# Patient Record
Sex: FEMALE | Race: WHITE | NOT HISPANIC OR LATINO | Employment: FULL TIME | URBAN - METROPOLITAN AREA
[De-identification: names, ages, dates, MRNs, and addresses within clinical notes are randomized per-mention and may not be internally consistent; named-entity substitution may affect disease eponyms.]

---

## 2019-12-04 ENCOUNTER — OFFICE VISIT (OUTPATIENT)
Dept: BARIATRICS | Facility: CLINIC | Age: 49
End: 2019-12-04
Payer: COMMERCIAL

## 2019-12-04 VITALS
DIASTOLIC BLOOD PRESSURE: 74 MMHG | WEIGHT: 164.8 LBS | TEMPERATURE: 97.4 F | SYSTOLIC BLOOD PRESSURE: 102 MMHG | HEIGHT: 60 IN | BODY MASS INDEX: 32.35 KG/M2 | HEART RATE: 78 BPM

## 2019-12-04 DIAGNOSIS — F41.9 ANXIETY: ICD-10-CM

## 2019-12-04 DIAGNOSIS — E66.9 OBESITY (BMI 30.0-34.9): Primary | ICD-10-CM

## 2019-12-04 PROBLEM — E66.3 OVERWEIGHT (BMI 25.0-29.9): Status: ACTIVE | Noted: 2019-12-04

## 2019-12-04 PROCEDURE — 99203 OFFICE O/P NEW LOW 30 MIN: CPT | Performed by: PHYSICIAN ASSISTANT

## 2019-12-04 RX ORDER — SUMATRIPTAN 100 MG/1
100 TABLET, FILM COATED ORAL
COMMUNITY
Start: 2014-09-19 | End: 2019-12-04 | Stop reason: ALTCHOICE

## 2019-12-04 RX ORDER — SERTRALINE HYDROCHLORIDE 100 MG/1
TABLET, FILM COATED ORAL
COMMUNITY
Start: 2019-10-21

## 2019-12-04 RX ORDER — CLONAZEPAM 0.5 MG/1
TABLET ORAL
COMMUNITY
Start: 2015-10-18

## 2019-12-04 RX ORDER — FLUOXETINE HYDROCHLORIDE 40 MG/1
60 CAPSULE ORAL
COMMUNITY
Start: 2016-09-13 | End: 2019-12-04 | Stop reason: ALTCHOICE

## 2019-12-04 RX ORDER — DAPSONE 50 MG/G
GEL TOPICAL
COMMUNITY
Start: 2018-01-18

## 2019-12-04 RX ORDER — SPIRONOLACTONE 50 MG/1
TABLET, FILM COATED ORAL
COMMUNITY
Start: 2019-11-16

## 2019-12-04 NOTE — ASSESSMENT & PLAN NOTE
-Discussed options of [facility]-Intensive Lifestyle Intervention Program, Very Low Calorie Diet-VLCD and Conservative Program and the role of weight loss medications   -Initial weight loss goal of 5-10% weight loss for improved health  -Screening labs  Recommend checking lab coverage before having labs drawn    -NEEDS Lipids, tsh, a1c, cmp, fasting insulin   - STOP BANG-0/8  -Patient is interested in pursuing Very Low Calorie Diet-VLCD    VLCD Review:  Labs ordered: yes  HTN meds addressed: n/a  DM2 meds addressed: n/a  VLCD time restriction based on BMI: no

## 2019-12-04 NOTE — PROGRESS NOTES
Assessment/Plan:    Obesity (BMI 30 0-34 9)  -Discussed options of [facility]-Intensive Lifestyle Intervention Program, Very Low Calorie Diet-VLCD and Conservative Program and the role of weight loss medications   -Initial weight loss goal of 5-10% weight loss for improved health  -Screening labs  Recommend checking lab coverage before having labs drawn  -NEEDS Lipids, tsh, a1c, cmp, fasting insulin   - STOP BANG-0/8  -Patient is interested in pursuing Very Low Calorie Diet-VLCD    VLCD Review:  Labs ordered: yes  HTN meds addressed: n/a  DM2 meds addressed: n/a  VLCD time restriction based on BMI: no    Anxiety  Taking klonopin and zoloft  -continue management with prescribing provider            Diagnoses and all orders for this visit:    Obesity (BMI 30 0-34 9)  -     Comprehensive metabolic panel; Future  -     TSH, 3rd generation with Free T4 reflex; Future  -     Lipid panel; Future  -     Hemoglobin A1C; Future  -     Insulin, fasting; Future    Anxiety    Other orders  -     Discontinue: SUMAtriptan (IMITREX) 100 mg tablet; Take 100 mg by mouth  -     spironolactone (ALDACTONE) 50 mg tablet  -     Dapsone 5 % topical gel  -     sertraline (ZOLOFT) 100 mg tablet  -     Discontinue: FLUoxetine (PROzac) 40 MG capsule; 60 mg  -     clonazePAM (KlonoPIN) 0 5 mg tablet  -     Discontinue: PROCTOFOAM HC rectal foam  -     Discontinue: lisdexamfetamine (VYVANSE) 30 MG capsule  -     OMEGA-3 FATTY ACIDS PO; Take by mouth          Subjective:   Chief Complaint   Patient presents with    Consult     Patient here for initial MWM consult with PA  BMI 32  Negative stop bang (0 of 8)  Patient ID: Saritha Corrigan  is a 51 y o  female with excess weight/obesity here to pursue weight management  Past Medical History:   Diagnosis Date    Anxiety     Obesity (BMI 30 0-34  9)     Overweight (BMI 25 0-29  9)        HPI:  Obesity/Excess Weight:  Severity: Moderate  Onset:  Since age 16    Modifiers: Diet and Exercise, Physician Supervised Weight Loss Program, Commercial Weight Loss Programs-ie  Weight Watchers, Martha Nay, Nutrisystem, etc  and Prescription Weight Loss Medications  Contributing factors: Poor Food Choices, mothers perception of her body   Associated symptoms: body image issues and decreased self esteem    Goals:135 lbs   Hydration: 5 seltzer per day, 3-4 large coffee with half half   Alcohol: rare     Patient notes she just eats because she does  She is not hungry  Has been given vyvanse by psychiatry for binge eating in the past but did not feel it helped  All of her life her mother has pushed diets on her and degraded her body image  Patient has seen therapist for the last 5 years regarding her weight, her mothers contribution to her weight issues and her obsession with weight  She notes this did not help and she has stopped seeing the therapist      Colonoscopy-Not applicable    The following portions of the patient's history were reviewed and updated as appropriate: allergies, current medications, past family history, past medical history, past social history, past surgical history and problem list     Review of Systems   HENT: Negative for sore throat  Respiratory: Negative for cough and shortness of breath  Cardiovascular: Negative for chest pain and palpitations  Gastrointestinal: Positive for constipation  Negative for abdominal pain, diarrhea, nausea and vomiting  Denies GERD   Endocrine: Negative for cold intolerance and heat intolerance  Genitourinary: Negative for dysuria  Musculoskeletal: Negative for arthralgias and back pain  Skin: Negative for rash  Neurological: Negative for headaches  Psychiatric/Behavioral: Negative for suicidal ideas (denies HI)          + Depression and anxiety--controlled with meds         Objective:    /74 (BP Location: Right arm, Patient Position: Sitting, Cuff Size: Standard)   Pulse 78   Temp (!) 97 4 °F (36 3 °C) (Tympanic) Ht 5' 0 25" (1 53 m)   Wt 74 8 kg (164 lb 12 8 oz)   BMI 31 92 kg/m²     Physical Exam   Nursing note and vitals reviewed  Constitutional   General appearance: Abnormal   well developed and obese  Eyes No conjunctival pallor  Ears, Nose, Mouth, and Throat Oral mucosa moist    Pulmonary   Respiratory effort: No increased work of breathing or signs of respiratory distress  Auscultation of lungs: Clear to auscultation, equal breath sounds bilaterally, no wheezes, no rales, no rhonci  Cardiovascular   Auscultation of heart: Normal rate and rhythm, normal S1 and S2, without murmurs  Examination of extremities for edema and/or varicosities: Normal   no edema  Abdomen   Abdomen: Abnormal   The abdomen was obese  Bowel sounds were normal  The abdomen was soft and nontender     Musculoskeletal   Gait and station: Normal     Psychiatric   Orientation to person, place and time: Normal     Affect: appropriate

## 2019-12-05 ENCOUNTER — OFFICE VISIT (OUTPATIENT)
Dept: BARIATRICS | Facility: CLINIC | Age: 49
End: 2019-12-05
Payer: COMMERCIAL

## 2019-12-05 VITALS — HEIGHT: 60 IN | BODY MASS INDEX: 32.39 KG/M2 | WEIGHT: 165 LBS

## 2019-12-05 DIAGNOSIS — R63.5 ABNORMAL WEIGHT GAIN: ICD-10-CM

## 2019-12-05 LAB — HBA1C MFR BLD HPLC: 5.4 %

## 2019-12-05 PROCEDURE — VLCD

## 2019-12-05 PROCEDURE — RECHECK

## 2019-12-05 NOTE — PROGRESS NOTES
Reviewed VLCD diet principles  Developed meal plan and reviewed product use  Ordered 2 week supply  We did not have the lab results for this patient, so reviewed plan, and ordered product, and she will  product once PA reviews lab results  Gave patient written VLCD education packet and left patient contact number  Will call contact patient in 2-3 days to assess tolerance

## 2019-12-09 ENCOUNTER — TELEPHONE (OUTPATIENT)
Dept: BARIATRICS | Facility: CLINIC | Age: 49
End: 2019-12-09

## 2019-12-09 LAB
ALBUMIN SERPL-MCNC: 4.9 G/DL (ref 3.5–5.5)
ALBUMIN/GLOB SERPL: 2.3 {RATIO} (ref 1.2–2.2)
ALP SERPL-CCNC: 41 IU/L (ref 39–117)
ALT SERPL-CCNC: 46 IU/L (ref 0–32)
AST SERPL-CCNC: 35 IU/L (ref 0–40)
BILIRUB SERPL-MCNC: <0.2 MG/DL (ref 0–1.2)
BUN SERPL-MCNC: 13 MG/DL (ref 6–24)
BUN/CREAT SERPL: 23 (ref 9–23)
CALCIUM SERPL-MCNC: 9.9 MG/DL (ref 8.7–10.2)
CHLORIDE SERPL-SCNC: 99 MMOL/L (ref 96–106)
CHOLEST SERPL-MCNC: 353 MG/DL (ref 100–199)
CO2 SERPL-SCNC: 27 MMOL/L (ref 20–29)
CREAT SERPL-MCNC: 0.57 MG/DL (ref 0.57–1)
GLOBULIN SER-MCNC: 2.1 G/DL (ref 1.5–4.5)
GLUCOSE SERPL-MCNC: 98 MG/DL (ref 65–99)
HBA1C MFR BLD: 5.4 % (ref 4.8–5.6)
HDLC SERPL-MCNC: 100 MG/DL
INSULIN SERPL-ACNC: 6.8 UIU/ML (ref 2.6–24.9)
LDLC SERPL CALC-MCNC: 243 MG/DL (ref 0–99)
POTASSIUM SERPL-SCNC: 4.7 MMOL/L (ref 3.5–5.2)
PROT SERPL-MCNC: 7 G/DL (ref 6–8.5)
SL AMB EGFR AFRICAN AMERICAN: 127 ML/MIN/1.73
SL AMB EGFR NON AFRICAN AMERICAN: 110 ML/MIN/1.73
SL AMB VLDL CHOLESTEROL CALC: 10 MG/DL (ref 5–40)
SODIUM SERPL-SCNC: 137 MMOL/L (ref 134–144)
TRIGL SERPL-MCNC: 52 MG/DL (ref 0–149)
TSH SERPL DL<=0.005 MIU/L-ACNC: 1.51 UIU/ML (ref 0.45–4.5)

## 2019-12-09 NOTE — TELEPHONE ENCOUNTER
I relayed lab results and recommendations to patient, who verbalized understanding  Her  will come in right now to  her product and she will call her PCP to follow-up on her liver levels and LDL     ----- Message from Fatmata Cifuentes PA-C sent at 12/9/2019  1:53 PM EST -----  Please call patient and inform her that her CMP shows elevated liver levels  She will need to follow up with PCP regarding further workup and management of this  Patient's lipid panel showed elevated cholesterol and LDL  Recommend the patient follow a low fat, low cholesterol diet, limiting refined carbohydrates like white bread, white rice, white pasta, chips/pretzels, sweets/desserts, and sugary beverages  Increase fruits/vegetables  Increase exercise as tolerated  Patient should follow up with PCP for further management and follow-up of her lipid panel  Please inform patient that her A1c, fasting insulin, TSH are all within normal limits  Patient okay to start vlcd    Please inform her she can  her product

## 2019-12-09 NOTE — TELEPHONE ENCOUNTER
Patient had labs drawn at [facility] and results are now in [employer]  She would like to know her results so that she can have her   her product right now so she can start VLCD

## 2019-12-23 ENCOUNTER — OFFICE VISIT (OUTPATIENT)
Dept: BARIATRICS | Facility: CLINIC | Age: 49
End: 2019-12-23

## 2019-12-23 VITALS
DIASTOLIC BLOOD PRESSURE: 62 MMHG | SYSTOLIC BLOOD PRESSURE: 108 MMHG | WEIGHT: 153.4 LBS | HEIGHT: 60 IN | BODY MASS INDEX: 30.12 KG/M2

## 2019-12-23 DIAGNOSIS — R63.5 ABNORMAL WEIGHT GAIN: Primary | ICD-10-CM

## 2019-12-23 PROCEDURE — VLCD

## 2019-12-23 PROCEDURE — RECHECK

## 2019-12-23 NOTE — PROGRESS NOTES
Weight Management Medical Nutrition Assessment  Eleuterio Hansen was here today for her 2 week follow-up visit in [facility]  Today she weighs 153 4 lbs giving her a loss of 11 6 lbs  She is following the plan exactly and only had one snack off the approved list   She is drinking the 80 oz of water every day  She is tolerating product without difficulty  She reports some constipation, but is taking the recommended fiber gummies and that is working  Labs ordered, BP taken  She will continue for the next 2 weeks  Anthropometric Measurements  Start Weight (#): 165 0 lbs  Current Weight (#): 153 4 lbs  TBW % Change from start weight:7%  Ideal Body Weight (#): 101 25  Goal Weight (#):130 lbs    Weight Loss History  Previous weight loss attempts: Commercial Programs (Weight Watchers, [employer], etc )  Counseling with  MD  FAD Diets (Cabbage soup, Grapefruit, Cleanse, etc )  High Protein/Low CHO diets (Atkins, Union, etc )    Food and Nutrition Related History  Wake up: 6:30 am   Bed Time: 10 - 11pm    Food Recall  Breakfast:shake   Snack:  Lunch:Shake  Snack:bar  Dinner: shake or pudding  Snack:      Beverages: water  Volume of beverage intake: 80 oz    Weekends: Same  Cravings: salty  Trouble area of day:evening    Frequency of Eating out: biweekly  Food restrictions:none  Cooking: self   Food Shopping: self    Physical Activity Intake  Activity:none  Frequency:rarely  Physical limitations/barriers to exercise: none    Estimated Needs  Energy    [employer] Needs: BMR : 4482   1# loss weekly sedentary:  1002             1-2# loss weekly lightly active:1221  Protein:55 - 69   (1 2-1 5g/kg IBW)  Fluid: 54 oz     (35mL/kg IBW)    Nutrition Diagnosis  Yes; Overweight/obesity  related to Excess energy intake as evidenced by  BMI more than normative standard for age and sex (obesity-grade II 35-39  9)       Nutrition Intervention    Nutrition Prescription  Calories:760 calories  Protein: 96 gr  Fluid: 80 oz Meal Plan (José/Pro/Carb)  Breakfast:  Shake (200/27/10)  Snack:  Lunch: Shake (200/27/10)  Snack: bar (160/15/13)  Dinner: Pudding (200/27/10)  Snack:    Nutrition Education:    VLCD Manual  Calorie controlled menu  Lean protein food choices  Healthy snack options  Food journaling tips      Nutrition Counseling:  Strategies: meal planning, portion sizes, healthy snack choices, hydration, fiber intake, protein intake, exercise, food journal      Monitoring and Evaluation:  Evaluation criteria:  Energy Intake  Meet protein needs  Maintain adequate hydration  Monitor weekly weight  Meal planning/preparation  Food journal   Decreased portions at mealtimes and snacks  Physical activity     Barriers to learning:none  Readiness to change: Action:  (Changing behavior)  Comprehension: good  Expected Compliance: good

## 2020-01-03 NOTE — PROGRESS NOTES
Weight Management Medical Nutrition Assessment  Devan Ramires was here today for her 4 week follow-up visit in [facility]  Today she weighs 149 0 lbs giving her a loss of 4 4 lbs  She continues to tolerate product, but admits that this time she ate several meals - so basically she is switching to a partial meal plan  Reviewed food journaling, and portion sizes  She is also going to start adding regular exercise to daily routine          Anthropometric Measurements  Start Weight (#): 165 0 lbs  Current Weight (#): 149 lbs  TBW % Change from start weight:7%  Ideal Body Weight (#): 101 25  Goal Weight (#):130 lbs     Weight Loss History  Previous weight loss attempts: Commercial Programs (Weight Watchers, [employer], etc )  Counseling with  MD  FAD Diets (Cabbage soup, Grapefruit, Cleanse, etc )  High Protein/Low CHO diets (Atkins, Union, etc )     Food and Nutrition Related History  Wake up: 6:30 am         Bed Time: 10 - 11pm     Food Recall  Breakfast:shake            Snack:  Lunch:Shake  Snack:bar  Dinner: shake or pudding  Snack:        Beverages: water  Volume of beverage intake: 80 oz     Weekends: Same  Cravings: salty  Trouble area of day:evening     Frequency of Eating out: biweekly  Food restrictions:none  Cooking: self   Food Shopping: self     Physical Activity Intake  Activity:none  Frequency:rarely  Physical limitations/barriers to exercise: none     Estimated Needs  Energy     [employer] [place] [employer] Needs: BMR : 4192   1# loss weekly sedentary:  1002             1-2# loss weekly lightly active:1221  Protein:55 - 69   (1 2-1 5g/kg IBW)  Fluid: 54 oz     (35mL/kg IBW)     Nutrition Diagnosis  Yes; Overweight/obesity  related to Excess energy intake as evidenced by  BMI more than normative standard for age and sex (obesity-grade II 35-39  9)     Nutrition Intervention     Nutrition Prescription  Calories:1000 calories  Protein: 55 - 69 gr  Fluid: 80 oz      Meal Plan (José/Pro/Carb)  Breakfast:  Shake (200/27/10)  Snack:  Lunch: Shake (200/27/10)  Snack: bar (160/15/13)  Dinner: [employer] protein, non-starchy vegetables  (300/27/10)  Snack:     Nutrition Education  Calorie controlled menu  Lean protein food choices  Healthy snack options  Food journaling tips        Nutrition Counseling:  Strategies: meal planning, portion sizes, healthy snack choices, hydration, fiber intake, protein intake, exercise, food journal        Monitoring and Evaluation:  Evaluation criteria:  Energy Intake  Meet protein needs  Maintain adequate hydration  Monitor weekly weight  Meal planning/preparation  Food journal   Decreased portions at mealtimes and snacks  Physical activity      Barriers to learning:none  Readiness to change: Action:  (Changing behavior)  Comprehension: good  Expected Compliance: good

## 2020-01-06 ENCOUNTER — OFFICE VISIT (OUTPATIENT)
Dept: BARIATRICS | Facility: CLINIC | Age: 50
End: 2020-01-06

## 2020-01-06 VITALS — BODY MASS INDEX: 29.25 KG/M2 | WEIGHT: 149 LBS | HEIGHT: 60 IN

## 2020-01-06 DIAGNOSIS — R63.5 ABNORMAL WEIGHT GAIN: ICD-10-CM

## 2020-01-06 PROCEDURE — VLCD

## 2020-01-06 PROCEDURE — RECHECK

## 2020-01-09 ENCOUNTER — TRANSCRIBE ORDERS (OUTPATIENT)
Dept: ADMINISTRATIVE | Facility: HOSPITAL | Age: 50
End: 2020-01-09

## 2020-01-09 ENCOUNTER — APPOINTMENT (OUTPATIENT)
Dept: LAB | Facility: HOSPITAL | Age: 50
End: 2020-01-09
Payer: COMMERCIAL

## 2020-01-09 DIAGNOSIS — R53.83 TIREDNESS: ICD-10-CM

## 2020-01-09 DIAGNOSIS — R94.5 NONSPECIFIC ABNORMAL RESULTS OF LIVER FUNCTION STUDY: ICD-10-CM

## 2020-01-09 DIAGNOSIS — E83.42 HYPOMAGNESEMIA: ICD-10-CM

## 2020-01-09 DIAGNOSIS — R53.83 TIREDNESS: Primary | ICD-10-CM

## 2020-01-09 LAB
ALBUMIN SERPL BCP-MCNC: 4 G/DL (ref 3.5–5)
ALP SERPL-CCNC: 37 U/L (ref 46–116)
ALT SERPL W P-5'-P-CCNC: 32 U/L (ref 12–78)
ANION GAP SERPL CALCULATED.3IONS-SCNC: 8 MMOL/L (ref 4–13)
AST SERPL W P-5'-P-CCNC: 15 U/L (ref 5–45)
BILIRUB DIRECT SERPL-MCNC: 0.08 MG/DL (ref 0–0.2)
BILIRUB SERPL-MCNC: 0.3 MG/DL (ref 0.2–1)
BUN SERPL-MCNC: 12 MG/DL (ref 5–25)
CALCIUM SERPL-MCNC: 9.5 MG/DL (ref 8.3–10.1)
CHLORIDE SERPL-SCNC: 100 MMOL/L (ref 100–108)
CO2 SERPL-SCNC: 30 MMOL/L (ref 21–32)
CREAT SERPL-MCNC: 0.58 MG/DL (ref 0.6–1.3)
GFR SERPL CREATININE-BSD FRML MDRD: 109 ML/MIN/1.73SQ M
GLUCOSE P FAST SERPL-MCNC: 104 MG/DL (ref 65–99)
MAGNESIUM SERPL-MCNC: 1.6 MG/DL (ref 1.6–2.6)
POTASSIUM SERPL-SCNC: 4 MMOL/L (ref 3.5–5.3)
PROT SERPL-MCNC: 7.4 G/DL (ref 6.4–8.2)
SODIUM SERPL-SCNC: 138 MMOL/L (ref 136–145)

## 2020-01-09 PROCEDURE — 36415 COLL VENOUS BLD VENIPUNCTURE: CPT

## 2020-01-09 PROCEDURE — 80048 BASIC METABOLIC PNL TOTAL CA: CPT

## 2020-01-09 PROCEDURE — 83735 ASSAY OF MAGNESIUM: CPT

## 2020-01-09 PROCEDURE — 80076 HEPATIC FUNCTION PANEL: CPT

## 2020-02-14 NOTE — PROGRESS NOTES
Weight Management Medical Nutrition Assessment  Brian was here today for 1/3 RD visits   Today she weighs 150 6  lbs giving her a gain 1 6 of lbs  She currently having 2 meal replacements, a bar and a sensible meal   She has gotten slightly off track, but seems like she will get back to it  She has been seeing her therapist relating to weight issues in the past and she has a positive outlook today rather than before when she seemed to be obsessing over her weight  She knows what she needs to do, and will start adding exercise to her routine         Anthropometric Measurements  Start Weight (#): 165 0 lbs  Current Weight (#): 140 6 lbs  TBW % Change from start weight:6%  Ideal Body Weight (#): 101 25  Goal Weight (#):130 lbs     Weight Loss History  Previous weight loss attempts: Commercial Programs (Weight Watchers, [employer], etc )  Counseling with  MD  FAD Diets (Cabbage soup, Grapefruit, Cleanse, etc )  High Protein/Low CHO diets (Atkins, Union, etc )     Food and Nutrition Related History  Wake up: 6:30 am         Bed Time: 10 - 11pm     Food Recall  Breakfast:shake            Snack:  Lunch:Shake  Snack:bar  Dinner: vegetable and chicken or fish  Snack:        Beverages: water  Volume of beverage intake: 80 oz     Weekends: Same  Cravings: salty  Trouble area of day:evening     Frequency of Eating out: biweekly  Food restrictions:none  Cooking: self   Food Shopping: self     Physical Activity Intake  Activity: treadmill  Frequency: 1 day per week  Physical limitations/barriers to exercise: none     Estimated Needs  Energy     [employer] Needs:  BMR : 8351   1# loss weekly sedentary:  1002             1-2# loss weekly lightly active:1221  Protein:55 - 69   (1 2-1 5g/kg IBW)  Fluid: 54 oz     (35mL/kg IBW)     Nutrition Diagnosis  Yes;    Overweight/obesity  related to Excess energy intake as evidenced by  BMI more than normative standard for age and sex (obesity-grade II 35-39  9)     Nutrition Intervention     Nutrition Prescription  Calories:1000 calories  Protein: 55 - 69 gr  Fluid: 80 oz      Meal Plan (José/Pro/Carb)  Breakfast:  Shake (200/27/10)  Snack:  Lunch: Shake (200/27/10)  Snack: bar (160/15/13)  Dinner: Lean protein, non-starchy vegetables  (300/27/10)  Snack:     Nutrition Education  Calorie controlled menu  Lean protein food choices  Healthy snack options  Food journaling tips        Nutrition Counseling:  Strategies: meal planning, portion sizes, healthy snack choices, hydration, fiber intake, protein intake, exercise, food journal        Monitoring and Evaluation:  Evaluation criteria:  Energy Intake  Meet protein needs  Maintain adequate hydration  Monitor weekly weight  Meal planning/preparation  Food journal   Decreased portions at mealtimes and snacks  Physical activity      Barriers to learning:none  Readiness to change: Action:  (Changing behavior)  Comprehension: good  Expected Compliance: good

## 2020-02-17 ENCOUNTER — OFFICE VISIT (OUTPATIENT)
Dept: BARIATRICS | Facility: CLINIC | Age: 50
End: 2020-02-17

## 2020-02-17 DIAGNOSIS — R63.5 ABNORMAL WEIGHT GAIN: ICD-10-CM

## 2020-02-17 PROCEDURE — RECHECK

## 2020-02-17 PROCEDURE — DB3PK

## 2020-04-07 ENCOUNTER — OFFICE VISIT (OUTPATIENT)
Dept: BARIATRICS | Facility: CLINIC | Age: 50
End: 2020-04-07

## 2020-04-07 DIAGNOSIS — R63.5 ABNORMAL WEIGHT GAIN: Primary | ICD-10-CM

## 2020-04-07 PROCEDURE — RECHECK

## 2020-08-30 ENCOUNTER — APPOINTMENT (OUTPATIENT)
Dept: CT IMAGING | Facility: HOSPITAL | Age: 50
End: 2020-08-30
Payer: COMMERCIAL

## 2020-08-30 ENCOUNTER — HOSPITAL ENCOUNTER (OUTPATIENT)
Facility: HOSPITAL | Age: 50
Setting detail: OBSERVATION
Discharge: HOME | End: 2020-08-31
Attending: EMERGENCY MEDICINE | Admitting: INTERNAL MEDICINE
Payer: COMMERCIAL

## 2020-08-30 DIAGNOSIS — L03.90 CELLULITIS: ICD-10-CM
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
## 2020-08-30 PROBLEM — L03.211 CELLULITIS OF FACE: Status: ACTIVE | Noted: 2020-08-30

## 2020-08-30 PROBLEM — S01.85XA OPEN WOUND OF FACE DUE TO DOG BITE: Status: ACTIVE | Noted: 2020-08-30

## 2020-08-30 LAB
ANION GAP SERPL CALCULATED.3IONS-SCNC: 6 MMOL/L (ref 4–13)
ATRIAL RATE: 82 BPM
BASOPHILS # BLD AUTO: 0.05 THOUSANDS/ΜL (ref 0–0.1)
BASOPHILS NFR BLD AUTO: 1 % (ref 0–1)
BUN SERPL-MCNC: 13 MG/DL (ref 5–25)
CALCIUM SERPL-MCNC: 9.3 MG/DL (ref 8.3–10.1)
CHLORIDE SERPL-SCNC: 98 MMOL/L (ref 100–108)
CO2 SERPL-SCNC: 32 MMOL/L (ref 21–32)
CREAT SERPL-MCNC: 0.64 MG/DL (ref 0.6–1.3)
EOSINOPHIL # BLD AUTO: 0.13 THOUSAND/ΜL (ref 0–0.61)
EOSINOPHIL NFR BLD AUTO: 1 % (ref 0–6)
ERYTHROCYTE [DISTWIDTH] IN BLOOD BY AUTOMATED COUNT: 12.1 % (ref 11.6–15.1)
GFR SERPL CREATININE-BSD FRML MDRD: 106 ML/MIN/1.73SQ M
GLUCOSE SERPL-MCNC: 113 MG/DL (ref 65–140)
HCT VFR BLD AUTO: 38.9 % (ref 34.8–46.1)
HGB BLD-MCNC: 12.7 G/DL (ref 11.5–15.4)
IMM GRANULOCYTES # BLD AUTO: 0.04 THOUSAND/UL (ref 0–0.2)
IMM GRANULOCYTES NFR BLD AUTO: 0 % (ref 0–2)
LYMPHOCYTES # BLD AUTO: 2.38 THOUSANDS/ΜL (ref 0.6–4.47)
LYMPHOCYTES NFR BLD AUTO: 26 % (ref 14–44)
MCH RBC QN AUTO: 31.1 PG (ref 26.8–34.3)
MCHC RBC AUTO-ENTMCNC: 32.6 G/DL (ref 31.4–37.4)
MCV RBC AUTO: 95 FL (ref 82–98)
MONOCYTES # BLD AUTO: 0.86 THOUSAND/ΜL (ref 0.17–1.22)
MONOCYTES NFR BLD AUTO: 10 % (ref 4–12)
NEUTROPHILS # BLD AUTO: 5.56 THOUSANDS/ΜL (ref 1.85–7.62)
NEUTS SEG NFR BLD AUTO: 62 % (ref 43–75)
NRBC BLD AUTO-RTO: 0 /100 WBCS
P AXIS: 72 DEGREES
PLATELET # BLD AUTO: 347 THOUSANDS/UL (ref 149–390)
PMV BLD AUTO: 9.6 FL (ref 8.9–12.7)
POTASSIUM SERPL-SCNC: 3.7 MMOL/L (ref 3.5–5.3)
PR INTERVAL: 124 MS
QRS AXIS: 81 DEGREES
QRSD INTERVAL: 82 MS
QT INTERVAL: 388 MS
QTC INTERVAL: 453 MS
RBC # BLD AUTO: 4.09 MILLION/UL (ref 3.81–5.12)
SODIUM SERPL-SCNC: 136 MMOL/L (ref 136–145)
T WAVE AXIS: 70 DEGREES
VENTRICULAR RATE: 82 BPM
WBC # BLD AUTO: 9.02 THOUSAND/UL (ref 4.31–10.16)

## 2020-08-30 PROCEDURE — 93010 ELECTROCARDIOGRAM REPORT: CPT | Performed by: INTERNAL MEDICINE

## 2020-08-30 PROCEDURE — 85025 COMPLETE CBC W/AUTO DIFF WBC: CPT | Performed by: EMERGENCY MEDICINE

## 2020-08-30 PROCEDURE — 36415 COLL VENOUS BLD VENIPUNCTURE: CPT | Performed by: EMERGENCY MEDICINE

## 2020-08-30 PROCEDURE — 70486 CT MAXILLOFACIAL W/O DYE: CPT

## 2020-08-30 PROCEDURE — 99285 EMERGENCY DEPT VISIT HI MDM: CPT | Performed by: EMERGENCY MEDICINE

## 2020-08-30 PROCEDURE — 93005 ELECTROCARDIOGRAM TRACING: CPT

## 2020-08-30 PROCEDURE — 99284 EMERGENCY DEPT VISIT MOD MDM: CPT

## 2020-08-30 PROCEDURE — 90471 IMMUNIZATION ADMIN: CPT

## 2020-08-30 PROCEDURE — 96374 THER/PROPH/DIAG INJ IV PUSH: CPT

## 2020-08-30 PROCEDURE — 90715 TDAP VACCINE 7 YRS/> IM: CPT | Performed by: EMERGENCY MEDICINE

## 2020-08-30 PROCEDURE — G1004 CDSM NDSC: HCPCS

## 2020-08-30 PROCEDURE — 80048 BASIC METABOLIC PNL TOTAL CA: CPT | Performed by: EMERGENCY MEDICINE

## 2020-08-30 PROCEDURE — 99220 PR INITIAL OBSERVATION CARE/DAY 70 MINUTES: CPT | Performed by: INTERNAL MEDICINE

## 2020-08-30 RX ORDER — OXYCODONE HYDROCHLORIDE 5 MG/1
5 TABLET ORAL EVERY 4 HOURS PRN
Status: DISCONTINUED | OUTPATIENT
Start: 2020-08-30 | End: 2020-08-31 | Stop reason: HOSPADM

## 2020-08-30 RX ORDER — CHLORAL HYDRATE 500 MG
1000 CAPSULE ORAL DAILY
Status: DISCONTINUED | OUTPATIENT
Start: 2020-08-30 | End: 2020-08-31 | Stop reason: HOSPADM

## 2020-08-30 RX ORDER — SPIRONOLACTONE 100 MG/1
100 TABLET, FILM COATED ORAL DAILY
Status: DISCONTINUED | OUTPATIENT
Start: 2020-08-30 | End: 2020-08-31 | Stop reason: HOSPADM

## 2020-08-30 RX ORDER — MAGNESIUM HYDROXIDE/ALUMINUM HYDROXICE/SIMETHICONE 120; 1200; 1200 MG/30ML; MG/30ML; MG/30ML
30 SUSPENSION ORAL EVERY 6 HOURS PRN
Status: DISCONTINUED | OUTPATIENT
Start: 2020-08-30 | End: 2020-08-31 | Stop reason: HOSPADM

## 2020-08-30 RX ORDER — ONDANSETRON 2 MG/ML
4 INJECTION INTRAMUSCULAR; INTRAVENOUS EVERY 6 HOURS PRN
Status: DISCONTINUED | OUTPATIENT
Start: 2020-08-30 | End: 2020-08-31 | Stop reason: HOSPADM

## 2020-08-30 RX ORDER — SERTRALINE HYDROCHLORIDE 100 MG/1
100 TABLET, FILM COATED ORAL DAILY
Status: DISCONTINUED | OUTPATIENT
Start: 2020-08-30 | End: 2020-08-31 | Stop reason: HOSPADM

## 2020-08-30 RX ORDER — CLONAZEPAM 0.5 MG/1
0.5 TABLET ORAL 2 TIMES DAILY PRN
Status: DISCONTINUED | OUTPATIENT
Start: 2020-08-30 | End: 2020-08-31 | Stop reason: HOSPADM

## 2020-08-30 RX ORDER — ACETAMINOPHEN 325 MG/1
975 TABLET ORAL EVERY 6 HOURS PRN
Status: DISCONTINUED | OUTPATIENT
Start: 2020-08-30 | End: 2020-08-31 | Stop reason: HOSPADM

## 2020-08-30 RX ADMIN — AMPICILLIN AND SULBACTAM 3 G: 2; 1 INJECTION, POWDER, FOR SOLUTION INTRAMUSCULAR; INTRAVENOUS at 01:25

## 2020-08-30 RX ADMIN — AMPICILLIN SODIUM AND SULBACTAM SODIUM 3 G: 2; 1 INJECTION, POWDER, FOR SOLUTION INTRAMUSCULAR; INTRAVENOUS at 08:02

## 2020-08-30 RX ADMIN — CLONAZEPAM 0.5 MG: 0.5 TABLET ORAL at 04:48

## 2020-08-30 RX ADMIN — ENOXAPARIN SODIUM 40 MG: 40 INJECTION SUBCUTANEOUS at 10:42

## 2020-08-30 RX ADMIN — OMEGA-3 FATTY ACIDS CAP 1000 MG 1000 MG: 1000 CAP at 10:41

## 2020-08-30 RX ADMIN — AMPICILLIN SODIUM AND SULBACTAM SODIUM 3 G: 2; 1 INJECTION, POWDER, FOR SOLUTION INTRAMUSCULAR; INTRAVENOUS at 13:53

## 2020-08-30 RX ADMIN — SERTRALINE HYDROCHLORIDE 100 MG: 100 TABLET, FILM COATED ORAL at 10:47

## 2020-08-30 RX ADMIN — AMPICILLIN SODIUM AND SULBACTAM SODIUM 3 G: 2; 1 INJECTION, POWDER, FOR SOLUTION INTRAMUSCULAR; INTRAVENOUS at 19:55

## 2020-08-30 RX ADMIN — TETANUS TOXOID, REDUCED DIPHTHERIA TOXOID AND ACELLULAR PERTUSSIS VACCINE, ADSORBED 0.5 ML: 5; 2.5; 8; 8; 2.5 SUSPENSION INTRAMUSCULAR at 01:19

## 2020-08-30 RX ADMIN — SPIRONOLACTONE 100 MG: 100 TABLET, FILM COATED ORAL at 10:47

## 2020-08-30 NOTE — PLAN OF CARE
Problem: PAIN - ADULT  Goal: Verbalizes/displays adequate comfort level or baseline comfort level  Description: Interventions:  - Encourage patient to monitor pain and request assistance  - Assess pain using appropriate pain scale  - Administer analgesics based on type and severity of pain and evaluate response  - Implement non-pharmacological measures as appropriate and evaluate response  - Consider cultural and social influences on pain and pain management  - Notify physician/advanced practitioner if interventions unsuccessful or patient reports new pain  Outcome: Progressing     Problem: INFECTION - ADULT  Goal: Absence or prevention of progression during hospitalization  Description: INTERVENTIONS:  - Assess and monitor for signs and symptoms of infection  - Monitor lab/diagnostic results  - Monitor all insertion sites, i e  indwelling lines, tubes, and drains  - Monitor endotracheal if appropriate and nasal secretions for changes in amount and color  - Mulga appropriate cooling/warming therapies per order  - Administer medications as ordered  - Instruct and encourage patient and family to use good hand hygiene technique  - Identify and instruct in appropriate isolation precautions for identified infection/condition  Outcome: Progressing     Problem: SAFETY ADULT  Goal: Patient will remain free of falls  Description: INTERVENTIONS:  - Assess patient frequently for physical needs  -  Identify cognitive and physical deficits and behaviors that affect risk of falls    -  Mulga fall precautions as indicated by assessment   - Educate patient/family on patient safety including physical limitations  - Instruct patient to call for assistance with activity based on assessment  - Modify environment to reduce risk of injury  - Consider OT/PT consult to assist with strengthening/mobility  Outcome: Progressing     Problem: DISCHARGE PLANNING  Goal: Discharge to home or other facility with appropriate resources  Description: INTERVENTIONS:  - Identify barriers to discharge w/patient and caregiver  - Arrange for needed discharge resources and transportation as appropriate  - Identify discharge learning needs (meds, wound care, etc )  - Arrange for interpretive services to assist at discharge as needed  - Refer to Case Management Department for coordinating discharge planning if the patient needs post-hospital services based on physician/advanced practitioner order or complex needs related to functional status, cognitive ability, or social support system  Outcome: Progressing     Problem: Knowledge Deficit  Goal: Patient/family/caregiver demonstrates understanding of disease process, treatment plan, medications, and discharge instructions  Description: Complete learning assessment and assess knowledge base    Interventions:  - Provide teaching at level of understanding  - Provide teaching via preferred learning methods  Outcome: Progressing

## 2020-08-30 NOTE — H&P
H&P- Juliet Downing 1970, 51 y o  female MRN: [id number]    Unit/Bed#: -01 Encounter: [id number]    Primary Care Provider: Ronald Farmer   Date and time admitted to hospital: 8/30/2020 12:34 AM        * Cellulitis of face  Assessment & Plan  · Suffered dog bite from personal pet on Wednesday  Started on Augmentin on Thursday after telemedicine visit  States has completed 6 doses prior to coming to ED  Persistent redness and warmth to face, and now with purulent drainage from puncture wounds  · Puncture wounds are over Lt maxilla in close proximity to the eye  (+) erythema, edema and warmth  No injury to eye noted  · CT facial bones pending  · No leukocytosis  Afebrile  · Received initial dose of Unasyn in ED  Will continue  · Clean warm compresses  · Pain control    Open wound of face due to dog bite  Assessment & Plan  · See AP above    Anxiety  Assessment & Plan  · Continue home dose Klonopin PRN  · Prescription verified in [facility]  Last filled Klonopin 0 5mg #360 x 90 days      VTE Prophylaxis: Enoxaparin (Lovenox)  / sequential compression device   Code Status: Level 1 Full Code  POLST: POLST form is not discussed and not completed at this time  Discussion with family: NA    Anticipated Length of Stay:  Patient will be admitted on an Observation basis with an anticipated length of stay of  Less than 2 midnights  Justification for Hospital Stay: See AP above    Total Time for Visit, including Counseling / Coordination of Care: 45 minutes  Greater than 50% of this total time spent on direct patient counseling and coordination of care  Chief Complaint:   Dog bite to face    History of Present Illness:    Juliet Downing is a 51 y o  female who presents with dog bite to face  Suffered dog bite from personal pet on Wednesday  Started on Augmentin on Thursday after telemedicine visit  States has completed 6 doses prior to coming to ED   Persistent redness and warmth to face, and now with purulent drainage from puncture wounds  Puncture wounds are over Lt maxilla in close proximity to the eye  (+) erythema, edema and warmth  No injury to eye noted  Review of Systems:    Review of Systems   Constitutional: Negative for activity change, chills and fever  HENT: Negative for congestion, ear pain, sinus pressure and sore throat  Eyes: Negative for visual disturbance  Respiratory: Negative for cough, shortness of breath and wheezing  Cardiovascular: Negative for chest pain, palpitations and leg swelling  Gastrointestinal: Negative for abdominal pain, constipation, diarrhea, nausea and vomiting  Genitourinary: Negative for difficulty urinating, dysuria, frequency and urgency  Musculoskeletal: Negative for neck pain and neck stiffness  Skin: Positive for wound (Dog bite to face)  Neurological: Negative for dizziness, syncope, light-headedness and headaches  All other systems reviewed and are negative  Past Medical and Surgical History:     Past Medical History:   Diagnosis Date    Acne     Anxiety     Obesity (BMI 30 0-34  9)     Overweight (BMI 25 0-29  9)        Past Surgical History:   Procedure Laterality Date    HYSTERECTOMY      Partial       Meds/Allergies:    Prior to Admission medications    Medication Sig Start Date End Date Taking? Authorizing Provider   clonazePAM (KlonoPIN) 0 5 mg tablet  10/18/15  Yes Historical Provider, MD   Dapsone 5 % topical gel  1/18/18  Yes Historical Provider, MD   OMEGA-3 FATTY ACIDS PO Take by mouth   Yes Historical Provider, MD   sertraline (ZOLOFT) 100 mg tablet  10/21/19  Yes Historical Provider, MD   spironolactone (ALDACTONE) 50 mg tablet  11/16/19  Yes Historical Provider, MD     I have reviewed home medications with patient personally      Allergies: No Known Allergies    Social History:     Marital Status: /Civil Union   Patient Pre-hospital Living Situation: Lives with   Patient Pre-hospital Level of Mobility: Ambulaory  Patient Pre-hospital Diet Restrictions: None  Substance Use History:   Social History     Substance and Sexual Activity   Alcohol Use Not Currently    Comment: rarely     Social History     Tobacco Use   Smoking Status Never Smoker   Smokeless Tobacco Never Used     Social History     Substance and Sexual Activity   Drug Use Never       Family History:    Family History   Problem Relation Age of Onset    Cancer Mother         uterine     Heart disease Father     No Known Problems Brother     Diabetes Maternal Grandfather     Heart disease Maternal Grandfather     Stroke Maternal Grandfather     Hypertension Neg Hx     Thyroid cancer Neg Hx     Thyroid disease Neg Hx        Physical Exam:     Vitals:   Blood Pressure: 138/69 (08/30/20 0037)  Pulse: 87 (08/30/20 0037)  Temperature: 98 2 °F (36 8 °C) (08/30/20 0037)  Temp Source: Oral (08/30/20 0037)  Respirations: 17 (08/30/20 0037)  Height: 5' 2" (157 5 cm) (08/30/20 0037)  Weight - Scale: 73 8 kg (162 lb 11 2 oz) (08/30/20 0037)  SpO2: 98 % (08/30/20 0037)    Physical Exam  Vitals signs reviewed  Constitutional:       Appearance: Normal appearance  HENT:      Head: Normocephalic  Mouth/Throat:      Mouth: Mucous membranes are moist    Eyes:      Extraocular Movements: Extraocular movements intact  Pupils: Pupils are equal, round, and reactive to light  Neck:      Musculoskeletal: Normal range of motion and neck supple  Cardiovascular:      Rate and Rhythm: Normal rate and regular rhythm  Pulses: Normal pulses  Heart sounds: Normal heart sounds  No murmur  No friction rub  No gallop  Pulmonary:      Effort: Pulmonary effort is normal       Breath sounds: Normal breath sounds  Abdominal:      Palpations: Abdomen is soft  Tenderness: There is no abdominal tenderness  There is no guarding or rebound  Musculoskeletal: Normal range of motion  General: No swelling or tenderness     Skin:     General: Skin is warm and dry  Neurological:      General: No focal deficit present  Mental Status: She is alert and oriented to person, place, and time  Psychiatric:         Mood and Affect: Mood normal          Behavior: Behavior normal          Thought Content: Thought content normal          Additional Data:     Lab Results: I have personally reviewed pertinent reports  Results from last 7 days   Lab Units 08/30/20  0116   WBC Thousand/uL 9 02   HEMOGLOBIN g/dL 12 7   HEMATOCRIT % 38 9   PLATELETS Thousands/uL 347   NEUTROS PCT % 62   LYMPHS PCT % 26   MONOS PCT % 10   EOS PCT % 1     Results from last 7 days   Lab Units 08/30/20  0116   SODIUM mmol/L 136   POTASSIUM mmol/L 3 7   CHLORIDE mmol/L 98*   CO2 mmol/L 32   BUN mg/dL 13   CREATININE mg/dL 0 64   ANION GAP mmol/L 6   CALCIUM mg/dL 9 3   GLUCOSE RANDOM mg/dL 113                       Imaging: NA    CT facial bones wo contrast    (Results Pending)       EKG, Pathology, and Other Studies Reviewed on Admission:   · EKG: NA    Allscripts / Epic Records Reviewed: Yes     ** Please Note: This note has been constructed using a voice recognition system   **

## 2020-08-30 NOTE — ED PROVIDER NOTES
History  Chief Complaint   Patient presents with    Dog Bite     Pt c/o getting bit on face by her dog on Wednesday night  Pt stated she got antibiotics from [facility] but that discharge was coming out of the bite when she pressed and was told to come in for evaluation  Patient is a 67-year-old female past medical history of anxiety presenting with dog bite to left cheek  Patient states that 3 nights ago she was bitten by her dog to the left cheek and had a tele health visit at which time she was prescribed Augmentin which she has taken a total of 6 doses since then  She noticed increased swelling, tenderness and large amount of purulent drainage today which has since resolved and states that her pain which she describes as a soreness is improved since she was able to express a large amount of purulent drainage  She states that she spoke with [facility] who told her to come to the emergency department if she was still having symptoms after antibiotics  She states the dog is up-to-date with all immunizations  She denies any fevers, nausea vomiting, upper respiratory symptoms, chest pain, shortness breath, dizziness rashes, dysuria, vision changes, numbness or tingling  Prior to Admission Medications   Prescriptions Last Dose Informant Patient Reported? Taking? Dapsone 5 % topical gel   Yes No   OMEGA-3 FATTY ACIDS PO   Yes No   Sig: Take by mouth   clonazePAM (KlonoPIN) 0 5 mg tablet   Yes No   sertraline (ZOLOFT) 100 mg tablet   Yes No   spironolactone (ALDACTONE) 50 mg tablet   Yes No      Facility-Administered Medications: None       Past Medical History:   Diagnosis Date    Anxiety     Obesity (BMI 30 0-34  9)     Overweight (BMI 25 0-29  9)        Past Surgical History:   Procedure Laterality Date    HYSTERECTOMY         Family History   Problem Relation Age of Onset   25 [facility] Cancer Mother         uterine     Heart disease Father     No Known Problems Brother     Diabetes Maternal Grandfather     Heart disease Maternal Grandfather     Stroke Maternal Grandfather     Hypertension Neg Hx     Thyroid cancer Neg Hx     Thyroid disease Neg Hx      I have reviewed and agree with the history as documented  E-Cigarette/Vaping     E-Cigarette/Vaping Substances     Social History     Tobacco Use    Smoking status: Never Smoker    Smokeless tobacco: Never Used   Substance Use Topics    Alcohol use: Yes     Comment: rarely    Drug use: Never       Review of Systems   All other systems reviewed and are negative  Physical Exam  Physical Exam  Vitals signs reviewed  Constitutional:       General: She is not in acute distress  Appearance: Normal appearance  She is not ill-appearing  HENT:      Mouth/Throat:      Mouth: Mucous membranes are moist    Eyes:      Conjunctiva/sclera: Conjunctivae normal    Neck:      Musculoskeletal: Neck supple  Cardiovascular:      Rate and Rhythm: Normal rate and regular rhythm  Heart sounds: Normal heart sounds  Pulmonary:      Effort: Pulmonary effort is normal       Breath sounds: Normal breath sounds  Abdominal:      General: Abdomen is flat  Palpations: Abdomen is soft  Tenderness: There is no abdominal tenderness  Musculoskeletal: Normal range of motion  General: No swelling  Skin:     General: Skin is warm and dry  Comments: Roughly 3-4 cm indurated area overlying left cheek with no fluctuance, purulence drainage, area is tender to palpation   Neurological:      General: No focal deficit present  Mental Status: She is alert     Psychiatric:         Mood and Affect: Mood normal          Vital Signs  ED Triage Vitals [08/30/20 0037]   Temperature Pulse Respirations Blood Pressure SpO2   98 2 °F (36 8 °C) 87 17 138/69 98 %      Temp Source Heart Rate Source Patient Position - Orthostatic VS BP Location FiO2 (%)   Oral Monitor Lying Right arm --      Pain Score       3           Vitals:    08/30/20 0037 BP: 138/69   Pulse: 87   Patient Position - Orthostatic VS: Lying         Visual Acuity      ED Medications  Medications   ampicillin-sulbactam (UNASYN) 3 g in sodium chloride 0 9 % 100 mL IVPB (3 g Intravenous New Bag 8/30/20 0125)   tetanus-diphtheria-acellular pertussis (BOOSTRIX) IM injection 0 5 mL (0 5 mL Intramuscular Given 8/30/20 0119)       Diagnostic Studies  Results Reviewed     Procedure Component Value Units Date/Time    Basic metabolic panel [[id number]]  (Abnormal) Collected:  08/30/20 0116    Lab Status:  Final result Specimen:  Blood from Arm, Right Updated:  08/30/20 0130     Sodium 136 mmol/L      Potassium 3 7 mmol/L      Chloride 98 mmol/L      CO2 32 mmol/L      ANION GAP 6 mmol/L      BUN 13 mg/dL      Creatinine 0 64 mg/dL      Glucose 113 mg/dL      Calcium 9 3 mg/dL      eGFR 106 ml/min/1 73sq m     Narrative:       [facility] guidelines for Chronic Kidney Disease (CKD):     Stage 1 with normal or high GFR (GFR > 90 mL/min/1 73 square meters)    Stage 2 Mild CKD (GFR = 60-89 mL/min/1 73 square meters)    Stage 3A Moderate CKD (GFR = 45-59 mL/min/1 73 square meters)    Stage 3B Moderate CKD (GFR = 30-44 mL/min/1 73 square meters)    Stage 4 Severe CKD (GFR = 15-29 mL/min/1 73 square meters)    Stage 5 End Stage CKD (GFR <15 mL/min/1 73 square meters)  Note: GFR calculation is accurate only with a steady state creatinine    CBC and differential [[id number]] Collected:  08/30/20 0116    Lab Status:  Final result Specimen:  Blood from Arm, Right Updated:  08/30/20 0121     WBC 9 02 Thousand/uL      RBC 4 09 Million/uL      Hemoglobin 12 7 g/dL      Hematocrit 38 9 %      MCV 95 fL      MCH 31 1 pg      MCHC 32 6 g/dL      RDW 12 1 %      MPV 9 6 fL      Platelets 151 Thousands/uL      nRBC 0 /100 WBCs      Neutrophils Relative 62 %      Immat GRANS % 0 %      Lymphocytes Relative 26 %      Monocytes Relative 10 %      Eosinophils Relative 1 %      Basophils Relative 1 %      Neutrophils Absolute 5 56 Thousands/µL      Immature Grans Absolute 0 04 Thousand/uL      Lymphocytes Absolute 2 38 Thousands/µL      Monocytes Absolute 0 86 Thousand/µL      Eosinophils Absolute 0 13 Thousand/µL      Basophils Absolute 0 05 Thousands/µL                  No orders to display              Procedures  Procedures         ED Course                                             MDM  Number of Diagnoses or Management Options  Cellulitis:   Dog bite, initial encounter:   Diagnosis management comments: Patient is a 61-year-old female past medical history of anxiety presenting for cellulitis of left cheek  Patient is well-appearing at bedside with stable vitals and in no acute distress  She has erythematous indurated area overlying her left cheek with small purulence drainage from puncture wounds both medially and laterally but no areas of induration concerning for abscess  As patient has failed outpatient Augmentin will administer IV antibiotics and admit  Disposition  Final diagnoses:   Dog bite, initial encounter   Cellulitis     Time reflects when diagnosis was documented in both MDM as applicable and the Disposition within this note     Time User Action Codes Description Comment    8/30/2020  1:48 AM Mervat Young Add Kyleear Beltran  0XXA] Dog bite, initial encounter     8/30/2020  1:49 AM Mervat Das [L03 [id number]] Cellulitis       ED Disposition     ED Disposition Condition Date/Time Comment    Admit Stable Sun Aug 30, 2020  1:33 AM Case was discussed with Harika Esposito and the patient's admission status was agreed to be Admission Status: observation status to the service of Dr Lili Ivy   Follow-up Information    None         Patient's Medications   Discharge Prescriptions    No medications on file     No discharge procedures on file      PDMP Review     None          ED Provider  Electronically Signed by           Jennifer Arreola DO  08/30/20 5316

## 2020-08-30 NOTE — ASSESSMENT & PLAN NOTE
· Suffered dog bite from personal pet on Wednesday  Started on Augmentin on Thursday after telemedicine visit  States has completed 6 doses prior to coming to ED  Persistent redness and warmth to face, and now with purulent drainage from puncture wounds  · Puncture wounds are over Lt maxilla in close proximity to the eye  (+) erythema, edema and warmth  No injury to eye noted  · CT facial bones pending  · No leukocytosis  Afebrile  · Received initial dose of Unasyn in ED   Will continue  · Clean warm compresses  · Pain control

## 2020-08-30 NOTE — ED NOTES
CC- Cellulitis r/t Dog Bite    Orientation status- A/Ox4    Abnormal labs/abnormal focused assessment/vitals- Pt has swollen reddened area on her left face   VSS    Medication/drips- IV ABX     IV lines/drains/etc- 20 Right AC    Isolation status- None    Skin- Clean Dry Intact    BMAT screening tool- Level 4     ED nurse's name and phone number- EVARISTO Moncada  08/30/20 [id number]

## 2020-08-30 NOTE — ASSESSMENT & PLAN NOTE
· Continue home dose Klonopin PRN  · Prescription verified in [facility]   Last filled Klonopin 0 5mg #360 x 90 days

## 2020-08-31 VITALS
TEMPERATURE: 98.8 F | WEIGHT: 162.7 LBS | DIASTOLIC BLOOD PRESSURE: 70 MMHG | HEIGHT: 62 IN | SYSTOLIC BLOOD PRESSURE: 95 MMHG | RESPIRATION RATE: 16 BRPM | HEART RATE: 75 BPM | BODY MASS INDEX: 29.94 KG/M2 | OXYGEN SATURATION: 97 %

## 2020-08-31 LAB
BASOPHILS # BLD AUTO: 0.05 THOUSANDS/ΜL (ref 0–0.1)
BASOPHILS NFR BLD AUTO: 1 % (ref 0–1)
EOSINOPHIL # BLD AUTO: 0.17 THOUSAND/ΜL (ref 0–0.61)
EOSINOPHIL NFR BLD AUTO: 3 % (ref 0–6)
ERYTHROCYTE [DISTWIDTH] IN BLOOD BY AUTOMATED COUNT: 12.1 % (ref 11.6–15.1)
HCT VFR BLD AUTO: 41.3 % (ref 34.8–46.1)
HGB BLD-MCNC: 13.3 G/DL (ref 11.5–15.4)
IMM GRANULOCYTES # BLD AUTO: 0.02 THOUSAND/UL (ref 0–0.2)
IMM GRANULOCYTES NFR BLD AUTO: 0 % (ref 0–2)
LYMPHOCYTES # BLD AUTO: 1.86 THOUSANDS/ΜL (ref 0.6–4.47)
LYMPHOCYTES NFR BLD AUTO: 35 % (ref 14–44)
MCH RBC QN AUTO: 30.8 PG (ref 26.8–34.3)
MCHC RBC AUTO-ENTMCNC: 32.2 G/DL (ref 31.4–37.4)
MCV RBC AUTO: 96 FL (ref 82–98)
MONOCYTES # BLD AUTO: 0.41 THOUSAND/ΜL (ref 0.17–1.22)
MONOCYTES NFR BLD AUTO: 8 % (ref 4–12)
NEUTROPHILS # BLD AUTO: 2.81 THOUSANDS/ΜL (ref 1.85–7.62)
NEUTS SEG NFR BLD AUTO: 53 % (ref 43–75)
NRBC BLD AUTO-RTO: 0 /100 WBCS
PLATELET # BLD AUTO: 367 THOUSANDS/UL (ref 149–390)
PMV BLD AUTO: 9.5 FL (ref 8.9–12.7)
RBC # BLD AUTO: 4.32 MILLION/UL (ref 3.81–5.12)
WBC # BLD AUTO: 5.32 THOUSAND/UL (ref 4.31–10.16)

## 2020-08-31 PROCEDURE — 99217 PR OBSERVATION CARE DISCHARGE MANAGEMENT: CPT | Performed by: INTERNAL MEDICINE

## 2020-08-31 PROCEDURE — 85025 COMPLETE CBC W/AUTO DIFF WBC: CPT | Performed by: INTERNAL MEDICINE

## 2020-08-31 RX ORDER — AMOXICILLIN AND CLAVULANATE POTASSIUM 875; 125 MG/1; MG/1
1 TABLET, FILM COATED ORAL EVERY 12 HOURS SCHEDULED
Qty: 24 TABLET | Refills: 0 | Status: SHIPPED | OUTPATIENT
Start: 2020-08-31 | End: 2020-09-12

## 2020-08-31 RX ORDER — AMOXICILLIN AND CLAVULANATE POTASSIUM 875; 125 MG/1; MG/1
1 TABLET, FILM COATED ORAL EVERY 12 HOURS SCHEDULED
Qty: 24 TABLET | Refills: 0 | Status: SHIPPED | OUTPATIENT
Start: 2020-08-31 | End: 2020-08-31 | Stop reason: SDUPTHER

## 2020-08-31 RX ADMIN — AMPICILLIN SODIUM AND SULBACTAM SODIUM 3 G: 2; 1 INJECTION, POWDER, FOR SOLUTION INTRAMUSCULAR; INTRAVENOUS at 08:31

## 2020-08-31 RX ADMIN — AMPICILLIN SODIUM AND SULBACTAM SODIUM 3 G: 2; 1 INJECTION, POWDER, FOR SOLUTION INTRAMUSCULAR; INTRAVENOUS at 01:36

## 2020-08-31 RX ADMIN — ENOXAPARIN SODIUM 40 MG: 40 INJECTION SUBCUTANEOUS at 08:32

## 2020-08-31 RX ADMIN — SERTRALINE HYDROCHLORIDE 100 MG: 100 TABLET, FILM COATED ORAL at 08:32

## 2020-08-31 RX ADMIN — OMEGA-3 FATTY ACIDS CAP 1000 MG 1000 MG: 1000 CAP at 08:32

## 2020-08-31 RX ADMIN — AMPICILLIN AND SULBACTAM 3 G: 2; 1 INJECTION, POWDER, FOR SOLUTION INTRAMUSCULAR; INTRAVENOUS at 14:18

## 2020-08-31 NOTE — CASE MANAGEMENT
LOS 1 DAY  RISK OF UNPLANNED READMISSION SCORE N/A   30 DAY READMISSION: NO  BUNDLE: NO    Per rounding, patient presents with a dog bit  AAO, self, IPTA  Patient here on IV ABX with CT of face pending  Likely no needs, CM to assess and follow through discharge

## 2020-08-31 NOTE — ASSESSMENT & PLAN NOTE
· Suffered dog bite from personal pet on Wednesday  Started on Augmentin on Thursday after telemedicine visit  States has completed 6 doses prior to coming to ED  Persistent redness and warmth to face, and now with purulent drainage from puncture wounds  · Puncture wounds are over Lt maxilla in close proximity to the eye  (+) erythema, edema and warmth  No injury to eye noted  · CT facial bones pending  · No leukocytosis  Afebrile    · Significantly improved on IV Unasyn  · Medically stable for discharge will discharge on oral Augmentin complete course

## 2020-08-31 NOTE — PLAN OF CARE
Problem: PAIN - ADULT  Goal: Verbalizes/displays adequate comfort level or baseline comfort level  Description: Interventions:  - Encourage patient to monitor pain and request assistance  - Assess pain using appropriate pain scale  - Administer analgesics based on type and severity of pain and evaluate response  - Implement non-pharmacological measures as appropriate and evaluate response  - Consider cultural and social influences on pain and pain management  - Notify physician/advanced practitioner if interventions unsuccessful or patient reports new pain  Outcome: Adequate for Discharge     Problem: INFECTION - ADULT  Goal: Absence or prevention of progression during hospitalization  Description: INTERVENTIONS:  - Assess and monitor for signs and symptoms of infection  - Monitor lab/diagnostic results  - Monitor all insertion sites, i e  indwelling lines, tubes, and drains  - Monitor endotracheal if appropriate and nasal secretions for changes in amount and color  - [place] appropriate cooling/warming therapies per order  - Administer medications as ordered  - Instruct and encourage patient and family to use good hand hygiene technique  - Identify and instruct in appropriate isolation precautions for identified infection/condition  Outcome: Adequate for Discharge     Problem: SAFETY ADULT  Goal: Patient will remain free of falls  Description: INTERVENTIONS:  - Assess patient frequently for physical needs  -  Identify cognitive and physical deficits and behaviors that affect risk of falls    -  [place] fall precautions as indicated by assessment   - Educate patient/family on patient safety including physical limitations  - Instruct patient to call for assistance with activity based on assessment  - Modify environment to reduce risk of injury  - Consider OT/PT consult to assist with strengthening/mobility  Outcome: Adequate for Discharge     Problem: DISCHARGE PLANNING  Goal: Discharge to home or other facility with appropriate resources  Description: INTERVENTIONS:  - Identify barriers to discharge w/patient and caregiver  - Arrange for needed discharge resources and transportation as appropriate  - Identify discharge learning needs (meds, wound care, etc )  - Arrange for interpretive services to assist at discharge as needed  - Refer to Case Management Department for coordinating discharge planning if the patient needs post-hospital services based on physician/advanced practitioner order or complex needs related to functional status, cognitive ability, or social support system  Outcome: Adequate for Discharge     Problem: Knowledge Deficit  Goal: Patient/family/caregiver demonstrates understanding of disease process, treatment plan, medications, and discharge instructions  Description: Complete learning assessment and assess knowledge base    Interventions:  - Provide teaching at level of understanding  - Provide teaching via preferred learning methods  Outcome: Adequate for Discharge

## 2020-08-31 NOTE — DISCHARGE SUMMARY
Discharge- Star Olson 1970, 51 y o  female MRN: [id number]    Unit/Bed#: -01 Encounter: [id number]    Primary Care Provider: Mookie Robert   Date and time admitted to hospital: 8/30/2020 12:34 AM        Open wound of face due to dog bite  Assessment & Plan  · See AP above    Anxiety  Assessment & Plan  · Continue home dose Klonopin PRN  · Prescription verified in [facility]  Last filled Klonopin 0 5mg #360 x 90 days    * Cellulitis of face  Assessment & Plan  · Suffered dog bite from personal pet on Wednesday  Started on Augmentin on Thursday after telemedicine visit  States has completed 6 doses prior to coming to ED  Persistent redness and warmth to face, and now with purulent drainage from puncture wounds  · Puncture wounds are over Lt maxilla in close proximity to the eye  (+) erythema, edema and warmth  No injury to eye noted  · CT facial bones pending  · No leukocytosis  Afebrile  · Significantly improved on IV Unasyn  · Medically stable for discharge will discharge on oral Augmentin complete course        Discharging Physician / Practitioner: Debra Lewis MD  PCP: Mookie Robert  Admission Date:   Admission Orders (From admission, onward)     Ordered        08/30/20 0133  Place in Observation  Once                   Discharge Date: 08/31/20    Resolved Problems  Date Reviewed: 8/31/2020    None          Consultations During Hospital Stay:  · none    Procedures Performed:   · none    Significant Findings / Test Results:   Ct Facial Bones Wo Contrast    Result Date: 8/31/2020  · Impression: Left facial soft tissue swelling suggestive of cellulitis with no discrete collection identified  No underlying osseous abnormality   Workstation performed: [id number]     Incidental Findings:   · none     Test Results Pending at Discharge (will require follow up):   · none     Outpatient Tests Requested:  · none    Complications:  none    Reason for Admission:  Cellulitis of the face    Hospital Course: Jennifer Luke is a 51 y o  female patient who originally presented to the hospital on 8/30/2020 due to cellulitis of face  Patient with no significant past medical history who presented after being bitten by her dog on the face  She was initially seen outpatient was found to have cellulitis at did not improve with oral Augmentin  She was given IV Unasyn inpatient significantly improved  Thus was discharged with oral Augmentin  She will follow-up with her PCP in 1 week      Please see above list of diagnoses and related plan for additional information  Condition at Discharge: stable     Discharge Day Visit / Exam:     Subjective:  Patient seen examined  No acute overnight events  Reports pain significantly improved  Denies any fevers, chills, abdominal pain, nausea, vomiting, or any other symptoms at this time  Vitals: Blood Pressure: 95/70 (08/31/20 0812)  Pulse: 75 (08/30/20 2344)  Temperature: 98 8 °F (37 1 °C) (08/31/20 0812)  Temp Source: Oral (08/30/20 1546)  Respirations: 16 (08/31/20 0812)  Height: 5' 2" (157 5 cm) (08/30/20 0037)  Weight - Scale: 73 8 kg (162 lb 11 2 oz) (08/30/20 0037)  SpO2: 97 % (08/30/20 2344)  Exam:   Physical Exam  Constitutional:       General: She is not in acute distress  Appearance: She is well-developed  She is not diaphoretic  HENT:      Head: Normocephalic and atraumatic  Nose: Nose normal       Mouth/Throat:      Pharynx: No oropharyngeal exudate  Eyes:      General: No scleral icterus  Right eye: No discharge  Left eye: No discharge  Conjunctiva/sclera: Conjunctivae normal       Pupils: Pupils are equal, round, and reactive to light  Neck:      Musculoskeletal: Normal range of motion and neck supple  Thyroid: No thyromegaly  Vascular: No JVD  Cardiovascular:      Rate and Rhythm: Normal rate and regular rhythm  Heart sounds: Normal heart sounds  No murmur  No friction rub  No gallop      Pulmonary: Effort: Pulmonary effort is normal  No respiratory distress  Breath sounds: Normal breath sounds  No wheezing or rales  Chest:      Chest wall: No tenderness  Abdominal:      General: Bowel sounds are normal  There is no distension  Palpations: Abdomen is soft  Tenderness: There is no abdominal tenderness  There is no guarding or rebound  Musculoskeletal: Normal range of motion  General: No tenderness or deformity  Skin:     General: Skin is warm and dry  Findings: Erythema present  No rash  Neurological:      Mental Status: She is alert and oriented to person, place, and time  Cranial Nerves: No cranial nerve deficit  Sensory: No sensory deficit  Motor: No abnormal muscle tone  Coordination: Coordination normal          Discussion with Family: patient    Discharge instructions/Information to patient and family:   See after visit summary for information provided to patient and family  Provisions for Follow-Up Care:  See after visit summary for information related to follow-up care and any pertinent home health orders  Disposition:     Home    For Discharges to [facility] SNF:   · Not Applicable to this Patient - Not Applicable to this Patient    Planned Readmission: none     Discharge Statement:  I spent 60 minutes discharging the patient  This time was spent on the day of discharge  I had direct contact with the patient on the day of discharge  Greater than 50% of the total time was spent examining patient, answering all patient questions, arranging and discussing plan of care with patient as well as directly providing post-discharge instructions  Additional time then spent on discharge activities  Discharge Medications:  See after visit summary for reconciled discharge medications provided to patient and family        ** Please Note: This note has been constructed using a voice recognition system **

## 2020-12-14 NOTE — UTILIZATION REVIEW
Initial Clinical Review    Admission: Date/Time/Statement:   Admission Orders (From admission, onward)     Ordered        08/30/20 0133  Place in Observation  Once                   Orders Placed This Encounter   Procedures    Place in Observation     Standing Status:   Standing     Number of Occurrences:   1     Order Specific Question:   Admitting Physician     Answer:   Dominick Shock [[id number]]     Order Specific Question:   Level of Care     Answer:   Med Surg [[id number]]     ED Arrival Information     Expected Arrival Acuity Means of Arrival Escorted By Service Admission Type    - 8/30/2020 00:30 Less Urgent Walk-In Spouse General Medicine Urgent    Arrival Complaint    Facial Injury        Chief Complaint   Patient presents with    Dog Bite     Pt c/o getting bit on face by her dog on Wednesday night  Pt stated she got antibiotics from telehealth but that discharge was coming out of the bite when she pressed and was told to come in for evaluation  Assessment/Plan: 52 yo female to ED from home w/ dog bite to face on wed  Started on Augmentin Thursday   Completed 6 doses  Presents / redness and warmth to face and purulent drainage from puncture wounds  Puncture wounds are over Lt maxilla in close proximity to the eye  (+) erythema, edema and warmth  Admitted OBS status w/ cellulitis of face , ct facial bones pending , cont iv unasyn , clean warm compresses and pain control       ED Triage Vitals [08/30/20 0037]   Temperature Pulse Respirations Blood Pressure SpO2   98 2 °F (36 8 °C) 87 17 138/69 98 %      Temp Source Heart Rate Source Patient Position - Orthostatic VS BP Location FiO2 (%)   Oral Monitor Lying Right arm --      Pain Score       3          Wt Readings from Last 1 Encounters:   08/30/20 73 8 kg (162 lb 11 2 oz)     Additional Vital Signs:   08/31/20 08:12:16   98 8 °F (37 1 °C)   --   16   95/70   78   --   --   --    08/30/20 23:44:22   98 3 °F (36 8 °C)   75   --   107/71   83   97 %   --   -- 08/30/20 1546   99 °F (37 2 °C)   73   18   100/59   --   97 %   None (Room air)   Lying    08/30/20 0732   98 5 °F (36 9 °C)   73   18   96/60   74   100 %   None (Room air         Pertinent Labs/Diagnostic Test Results:   8/30 CT facial bones - pending   8/30 EKG - NSR  Results from last 7 days   Lab Units 08/31/20  0654 08/30/20  0116   WBC Thousand/uL 5 32 9 02   HEMOGLOBIN g/dL 13 3 12 7   HEMATOCRIT % 41 3 38 9   PLATELETS Thousands/uL 367 347   NEUTROS ABS Thousands/µL 2 81 5 56     Results from last 7 days   Lab Units 08/30/20  0116   SODIUM mmol/L 136   POTASSIUM mmol/L 3 7   CHLORIDE mmol/L 98*   CO2 mmol/L 32   ANION GAP mmol/L 6   BUN mg/dL 13   CREATININE mg/dL 0 64   EGFR ml/min/1 73sq m 106   CALCIUM mg/dL 9 3       Results from last 7 days   Lab Units 08/30/20  0116   GLUCOSE RANDOM mg/dL 113     ED Treatment:   Medication Administration from 08/30/2020 0030 to 08/30/2020 1224       Date/Time Order Dose Route Action     08/30/2020 0119 tetanus-diphtheria-acellular pertussis (BOOSTRIX) IM injection 0 5 mL 0 5 mL Intramuscular Given     08/30/2020 0125 ampicillin-sulbactam (UNASYN) 3 g in sodium chloride 0 9 % 100 mL IVPB 3 g Intravenous New Bag        Past Medical History:   Diagnosis Date    Acne     Anxiety     Obesity (BMI 30 0-34  9)     Overweight (BMI 25 0-29  9)      Present on Admission:   Open wound of face due to dog bite   Cellulitis of face   Anxiety      Admitting Diagnosis: Cellulitis [L03 90]  Facial injury [S09 93XA]  Dog bite, initial encounter [W45  0XXA]  Age/Sex: 51 y o  female  Admission Orders:  Scheduled Medications:  ampicillin-sulbactam, 3 g, Intravenous, Q6H  enoxaparin, 40 mg, Subcutaneous, Daily  fish oil, 1,000 mg, Oral, Daily  sertraline, 100 mg, Oral, Daily  spironolactone, 100 mg, Oral, Daily      Continuous IV Infusions:     PRN Meds:  acetaminophen, 975 mg, Oral, Q6H PRN  aluminum-magnesium hydroxide-simethicone, 30 mL, Oral, Q6H PRN  clonazePAM, 0 5 mg, Oral, BID PRN  magnesium hydroxide, 30 mL, Oral, Daily PRN  ondansetron, 4 mg, Intravenous, Q6H PRN  oxyCODONE, 5 mg, Oral, Q4H PRN    Up and OOB   Reg diet   Tele     Network Utilization Review Department  [email or web address]  ATTENTION: Please call with any questions or concerns to [phone] and carefully listen to the prompts so that you are directed to the right person  All voicemails are confidential   Sudie Crigler all requests for admission clinical reviews, approved or denied determinations and any other requests to dedicated fax number below belonging to the campus where the patient is receiving treatment   List of dedicated fax numbers for the Facilities:  [address] DENIALS (Administrative/Medical Necessity) [phone]   [address] (Maternity/NICU/Pediatrics) [phone]   Blaise Min [phone]   Nathalia Sheets [phone]   Cassi Long [phone]   Brian Koehler [phone]   [address] [place] [address] [phone]   [facility]  [phone]   [address] [facility] [address]  [address] [address] [address] [facility] [phone] [Fully active, able to carry on all pre-disease performance without restriction] : Status 0 - Fully active, able to carry on all pre-disease performance without restriction [Normal] : affect appropriate [de-identified] : R w/o nipple retraction skin dimpling o rpalp masses; L s/p MRM with flap reconstruction, no palp masses. B/L ax neg